# Patient Record
Sex: FEMALE | Race: WHITE | URBAN - METROPOLITAN AREA
[De-identification: names, ages, dates, MRNs, and addresses within clinical notes are randomized per-mention and may not be internally consistent; named-entity substitution may affect disease eponyms.]

---

## 2017-09-12 ENCOUNTER — HOSPITAL ENCOUNTER (OUTPATIENT)
Dept: PHYSICAL THERAPY | Age: 57
Discharge: HOME | End: 2017-09-30
Attending: FAMILY MEDICINE | Admitting: FAMILY MEDICINE

## 2017-09-12 ASSESSMENT — PAIN SCALES - GENERAL: PAINLEVEL_OUTOF10: 3

## 2017-09-12 ASSESSMENT — PAIN DESCRIPTION - ORIENTATION: ORIENTATION: RIGHT

## 2017-09-12 ASSESSMENT — PAIN DESCRIPTION - PAIN TYPE: TYPE: CHRONIC PAIN

## 2017-09-12 ASSESSMENT — PAIN DESCRIPTION - LOCATION: LOCATION: BACK

## 2017-09-12 ASSESSMENT — PAIN DESCRIPTION - DESCRIPTORS: DESCRIPTORS: SHARP;DULL

## 2017-09-12 ASSESSMENT — PAIN DESCRIPTION - PROGRESSION: CLINICAL_PROGRESSION: GRADUALLY WORSENING

## 2017-09-12 ASSESSMENT — PAIN DESCRIPTION - FREQUENCY: FREQUENCY: INTERMITTENT

## 2017-09-12 NOTE — FLOWSHEET NOTE
Outpatient Physical Therapy           [place]           [x] Phone: [phone]   Fax: [phone]  [place]           [] Phone: [phone]   Fax: [phone]    Physical Therapy Daily Treatment Note  Date:  2017    Patient Name:  Antoinette Stokes    :  1960  MRN: [id number]  Restrictions/Precautions:   Diagnosis:    Low back pain/ R shoulder pain   Date of Surgery:   Treatment Diagnosis: Treatment Diagnosis: M47.16, decreased strength, decreased core sterngth    Insurance/Certification information:  [address]  Referring Physician:   Dr. Amena Fitzpatrick  Next Doctor Visit:    Plan of care signed (Y/N):  sent  Visit# / total visits:   1/10  Pain level: 3/10 back    Goals:       Short term goals  Time Frame for Short term goals: defer to long term goals   Long term goals  Time Frame for Long term goals : 5 weeks: 10/18/17  Long term goal 1: Pt will be I with HEP   Long term goal 2: Pt will report performing full Toby activity without increase in pain in the shoulder. Long term goal 3: Pt will report no R sided back pain with equal alignment for at least 1 week. Long term goal 4: Pt will rdemonstrate full strength of the shoulder without pain. Long term goal 5: Pt will report sleeping without increse in pain in the back and shoulder. Subjective: Had previous therapy on the R shoulder and was doing fine but then started again in January/February without GOPI just started to hurt. She has tried to rest it and not work it and it gets better but mentally needs to work out to keep sanity. MRI with Dr. Lisa Rick: no injury to the RTC. X-ray : B OA of the shoulders . BACK: injury in the 20s  but the R sided back pain started a few years ago but has become more persist in the past 5 months. X-rays: lumbar spondylosis . no radicular pain. Tried therapy but helped very little    Any changes in Ambulatory Summary Sheet?     Objective:  See eval.       Exercises:  Exercise/Equipment 17 Date Date Date barriers to learning. Demonstrates no mental or cognitive disorder. Patient reports they learn best through demonstration.      Time In / Time Out:     1100/1215                  Timed Code/Total Treatment Minutes:  1 PTeval (), 2 TE (30')     Patients Report of Tolerance:    [] Patient limited by fatigue        [] Patient limited by pain   [] Patient limited by other medical complications   [x] Other: lisandro well     Prognosis:   [x] Good [] Fair  [] Poor    Plan:   [] Continue per plan of care [] Alter current plan (see comments)  [x] Plan of care initiated [] Hold pending MD visit [] Discharge    Plan for Next Session:    BACK: core stabilization exercises in standing (possible dead bug, hamstring stretching)  Shoulder: possible dry needling, RTC strengthening, scap strengthening with GOOD form      Next Progress Note due:      navarro 9/12/17      Electronically signed by:  Katherine Mota, PT 9/12/2017, 1:19 PM      9/12/2017 1:27 PM

## 2017-09-12 NOTE — PROGRESS NOTES
A)  [] CM  (80-99% Impairment, Dep.)   [] CN  (100% Impairment, Tot Dep.)  [] CH (0% Impaired, Indep.)  [] CI (1-19% Impaired, SBA-CGA)  [] CJ (20-39% Impaired, MIN A)  [] CK  (40-59% Impairment, Mod A)  [] CL  (60-79% Impairment, Max A)  [] CM  (80-99% Impairment, Dep.)   [] CN  (100% Impairment, Tot Dep.)          Electronically signed by:  Bibiana Cobian PT, 9/12/2017, 1:16 PM      9/12/2017 1:18 PM     If you have any questions or concerns, please don't hesitate to call.   Thank you for your referral.      Physician Signature:________________________________Date:_________ TIME: _____  By signing above, therapists plan is approved by physician
G-Codes  Functional Assessment Tool Used: DASH  Score: 27%  Functional Limitation: Changing and maintaining body position  Changing and Maintaining Body Position Current Status (): At least 20 percent but less than 40 percent impaired, limited or restricted  Changing and Maintaining Body Position Goal Status (): At least 1 percent but less than 20 percent impaired, limited or restricted                       Goals  Short term goals  Time Frame for Short term goals: defer to long term goals   Long term goals  Time Frame for Long term goals : 5 weeks: 10/18/17  Long term goal 1: Pt will be I with HEP   Long term goal 2: Pt will report performing full Toby activity without increase in pain in the shoulder. Long term goal 3: Pt will report no R sided back pain with equal alignment for at least 1 week. Long term goal 4: Pt will rdemonstrate full strength of the shoulder without pain. Long term goal 5: Pt will report sleeping without increse in pain in the back and shoulder.    Patient Goals   Patient goals : to have less pain with working out        Therapy Time   Individual Concurrent Group Co-treatment   Time In [address], [address] [facility]     9/12/2017 1:10 PM

## 2017-09-14 ENCOUNTER — HOSPITAL ENCOUNTER (OUTPATIENT)
Dept: PHYSICAL THERAPY | Age: 57
Discharge: HOME | End: 2017-09-14
Admitting: FAMILY MEDICINE

## 2017-09-21 ENCOUNTER — HOSPITAL ENCOUNTER (OUTPATIENT)
Dept: PHYSICAL THERAPY | Age: 57
Discharge: HOME | End: 2017-09-21
Admitting: FAMILY MEDICINE

## 2017-09-26 ENCOUNTER — HOSPITAL ENCOUNTER (OUTPATIENT)
Dept: PHYSICAL THERAPY | Age: 57
Discharge: HOME | End: 2017-09-26
Admitting: FAMILY MEDICINE

## 2017-09-28 ENCOUNTER — HOSPITAL ENCOUNTER (OUTPATIENT)
Dept: PHYSICAL THERAPY | Age: 57
Discharge: HOME | End: 2017-09-28
Admitting: FAMILY MEDICINE

## 2017-10-01 ENCOUNTER — HOSPITAL ENCOUNTER (OUTPATIENT)
Dept: OTHER | Age: 57
Discharge: HOME | End: 2017-10-31
Attending: FAMILY MEDICINE | Admitting: FAMILY MEDICINE

## 2017-10-03 ENCOUNTER — HOSPITAL ENCOUNTER (OUTPATIENT)
Dept: PHYSICAL THERAPY | Age: 57
Discharge: HOME | End: 2017-10-03
Admitting: FAMILY MEDICINE

## 2017-10-03 NOTE — FLOWSHEET NOTE
well. Pt rated pain 3/10 after treatment. Time In / Time Out:   1033/1128    Timed Code/Total Treatment Minutes: 45'/55': 3 TE (39') 10' CP     Patients Report of Tolerance:    [] Patient limited by fatigue        [] Patient limited by pain   [] Patient limited by other medical complications   [x] Other: lisandro well     Prognosis:   [x] Good [] Fair  [] Poor    Plan:   [] Continue per plan of care [] Alter current plan (see comments)  [x] Plan of care initiated [] Hold pending MD visit [] Discharge    Plan for Next Session:    BACK: core stabilization exercises in standing (possible dead bug, hamstring stretching)  Shoulder: possible dry needling, RTC strengthening, scap strengthening with GOOD form  Primary PT leaving facility on October 6th. Transfer POC to Denisse Baig PT.        Next Progress Note due:      navarro 9/12/17, PN next visit       Electronically signed by:  Zuly Doran, PT 10/3/2017, 8:48 AM      10/3/2017 11:29 AM

## 2017-10-05 ENCOUNTER — HOSPITAL ENCOUNTER (OUTPATIENT)
Dept: PHYSICAL THERAPY | Age: 57
Discharge: HOME | End: 2017-10-05
Admitting: FAMILY MEDICINE

## 2017-10-05 NOTE — PROGRESS NOTES
()   [] Goal ()   [] DC ()  [] Self-Care     [] Current ()   [] Goal ()   [] DC ()  [] Other PT/OT primary DX     [] Current ()   [] Goal ()   [] DC ()    SEVERITY  CURRENT  GOAL  DISCHARGE   [] CH (0% Impaired, Indep.)  [x] CI (1-19% Impaired, SBA-CGA)  [] CJ (20-39% Impaired, MIN A)  [] CK  (40-59% Impairment, Mod A)  [] CL  (60-79% Impairment, Max A)  [] CM  (80-99% Impairment, Dep.)   [] CN  (100% Impairment, Tot Dep.) [x] CH (0% Impaired, Indep.)  [] CI (1-19% Impaired, SBA-CGA)  [] CJ (20-39% Impaired, MIN A)  [] CK  (40-59% Impairment, Mod A)  [] CL  (60-79% Impairment, Max A)  [] CM  (80-99% Impairment, Dep.)   [] CN  (100% Impairment, Tot Dep.)  [] CH (0% Impaired, Indep.)  [] CI (1-19% Impaired, SBA-CGA)  [] CJ (20-39% Impaired, MIN A)  [] CK  (40-59% Impairment, Mod A)  [] CL  (60-79% Impairment, Max A)  [] CM  (80-99% Impairment, Dep.)   [] CN  (100% Impairment, Tot Dep.)          Frequency/Duration:  # Days per week: [] 1 day # Weeks: [] 1 week [] 4 weeks [] 8 weeks     [x] 2 days? [] 2 weeks [x] 5 weeks [] 10 weeks     [] 3 days   [] 3 weeks [] 6 weeks [] 12 weeks       Rehab Potential: [] Excellent [x] Good [] Fair  [] Poor         Patient Status: [x] Continue per initial plan of Care     [] Patient now discharged     [x] Additional visits requested, Please re-certify for additional visits:      Requested frequency/duration:  2/week for 4/weeks    If we are requesting more visits, we fully anticipate the patient's condition is expected to improve within the treatment timeframe we are requesting. Electronically signed by:  Trevor Johnson PT, 10/5/2017, 2:54 PM    10/5/2017 2:55 PM     If you have any questions or concerns, please don't hesitate to call.   Thank you for your referral.    Physician Signature:______________________ Date:______ Time: ________  By signing above, therapists plan is approved by physician

## 2017-10-05 NOTE — FLOWSHEET NOTE
Outpatient Physical Therapy           [place]           [x] Phone: [phone]   Fax: [phone]  [place]           [] Phone: [phone]   Fax: [phone]    Physical Therapy Daily Treatment Note  Date:  10/5/2017    Patient Name:  Nubia Xiao    :  1960  MRN: [id number]  Restrictions/Precautions:   Diagnosis:    Low back pain/ R shoulder pain   Date of Surgery:   Treatment Diagnosis: Treatment Diagnosis: M47.16, decreased strength, decreased core sterngth    Insurance/Certification information:  [address]  Referring Physician:   Dr. Blaine Tyler  Next Doctor Visit:    Plan of care signed (Y/N):  sent  Visit# / total visits:   8/10  Pain level: 10 back, R shoulder 2/10   Goals:       Short term goals  Time Frame for Short term goals: defer to long term goals   Long term goals  Time Frame for Long term goals : 5 weeks: 10/18/17  Long term goal 1: Pt will be I with HEP. MET   Long term goal 2: Pt will report performing full Toby activity without increase in pain in the shoulder. Progressing   Long term goal 3: Pt will report no R sided back pain with equal alignment for at least 1 week. Progressing   Long term goal 4: Pt will rdemonstrate full strength of the shoulder without pain. Partially MET   Long term goal 5: Pt will report sleeping without increse in pain in the back and shoulder. Progressing         Subjective: Pt reports that she feels good today. After last session her pain was quite a bit high though. Had to take aleve. Still not doing some of the arm moves in [address] because that will increase shoulder pain. Hip flexors are getting better with Toby if she stretches before. Hasn't done yoga or weights at all. Will try some of the weight machines in the next week. SI joint pain does still come and go and sometimes is okay but other times can flare up. Was sleeping okay but after last session was really bad/ Last session sleeping was worst  That it was before therapy.  Other than Re-education    [] Cold/hotpack [] Iontophoresis   [x] Instruction in HEP      [] Vasopneumatic     [x] Manual Therapy               [] Aquatic Therapy     Manual Treatments:       Modalities:  CP to the R shoulder and CP to the SI region in sacrum x10'    Communication with other providers:  CErt. Faxed 9/12/17    Education provided to patient/caregiver:  Core strengthening, posture    Adverse reactions to treatment:  none    Equipment provided:  ZONIA LEIGH    Assessment: Pt has made progressing with pain levels but still has inconsistent pain and will be high after therapy but returns to normal the day after therapy. Pelvis and sacrum have remained level over multiple weeks and patient does very well with core engagement and TrA with all activities. HIp extension and back extensor strength is very impaired. Shoulder trigger points improved with dry needling but then increased in new spot but patient hesitant to try needling again because of the temporary increase in pain that the needling caused. Pt continues to need cues to keep scapular retracted and to maintain good scapular stability with exercises and UT frequently over compensates with elevation motion. This has made difficulty for patient to return to Toby and yoga. Pt to continue to benefit from skilled PT services to increase scapular stability and strength to return to PLOF without increase in shoulder pain.      Time In / Time Out:  1347/1450    Timed Code/Total Treatment Minutes: 53'/63': 4 TE (48') 10' CP     Patients Report of Tolerance:    [] Patient limited by fatigue        [] Patient limited by pain   [] Patient limited by other medical complications   [x] Other: lisandro well     Prognosis:   [x] Good [] Fair  [] Poor    Plan:   [] Continue per plan of care [] Alter current plan (see comments)  [x] Plan of care initiated [] Hold pending MD visit [] Discharge    Plan for Next Session:    BACK: core stabilization exercises in standing (possible dead bug,

## 2017-10-10 ENCOUNTER — HOSPITAL ENCOUNTER (OUTPATIENT)
Dept: PHYSICAL THERAPY | Age: 57
Discharge: HOME | End: 2017-10-10
Admitting: FAMILY MEDICINE

## 2017-10-10 NOTE — FLOWSHEET NOTE
Outpatient Physical Therapy           [place]           [x] Phone: [phone]   Fax: [phone]  [place]           [] Phone: [phone]   Fax: [phone]    Physical Therapy Daily Treatment Note  Date:  10/10/2017    Patient Name:  Alexx Sanchez    :  1960  MRN: [id number]  Restrictions/Precautions:   Diagnosis:    Low back pain/ R shoulder pain   Date of Surgery:   Treatment Diagnosis: Treatment Diagnosis: M47.16, decreased strength, decreased core sterngth    Insurance/Certification information:  [facility]  Referring Physician:   Dr. Kale Abraham  Next Doctor Visit:    Plan of care signed (Y/N):  Sent - refaxed 10/10  Visit# / total visits:   9/10  Pain level: 4-5/10 R shoulder, 0-2/10 back pain      Goals:       Short term goals  Time Frame for Short term goals: defer to long term goals   Long term goals  Time Frame for Long term goals : 5 weeks: 10/18/17  Long term goal 1: Pt will be I with HEP. MET   Long term goal 2: Pt will report performing full Toby activity without increase in pain in the shoulder. Progressing   Long term goal 3: Pt will report no R sided back pain with equal alignment for at least 1 week. Progressing   Long term goal 4: Pt will rdemonstrate full strength of the shoulder without pain. Partially MET   Long term goal 5: Pt will report sleeping without increse in pain in the back and shoulder. Progressing         Subjective: Patient states that since last session her pain went up to an 8/10 in the R shoulder and was like that for a couple of days. Pain is down now but still about a 4-6/10, had to take an aleve. Any changes in Ambulatory Summary Sheet? No     Objective:  Cueing to increase glut firing with supine walk outs.       Exercises:  Exercise/Equipment 10/3/17 10/5/17 10/10/17         Elliptical    5'  5' ramp 5 Ramp 5 5'   Hip flexor stretch X30\" ea LE x1' ea LE 1' ea LE    Hamstring stretch  X30\" ea LE  x1' ea  1' ea         BACK       Prone donkey kicks on ball  x15 ea LE x15 ea LE  15* ea LE    Prone SB with alt UE/LE kicks  x15 ea  x15 ea  15* ea   SB walk out (supine)  x15 x15  15*    Prone ball walk outs with SA  x15 x15 -   Prone on ball back ext to neutral  x10 x10  10*   Seated ball with TrA and opp pulls (flex/ext )  GTB x15 ea  GTB x15 ea   GTB 15* ea   TRX rows 2x10  2x10 --   BOSU plank with arms straight  4x15\"  --   IR/ER BTB x15 ea dir  BTB x15 ea dir  BTB 15* ea dir    Prone   Ext  HA  Y   x20  2x10  x10    1# x20  2x10  x10   20*  20*  10*   Diagonals  BTB x15 ea  BTB x15 ea   BTB 15* ea   Ball on wall  x10 ea dir  x10 ea dir   10* ea   BOSU rocks    x20 ea dir  --             Other Therapeutic Activities/Education:      Home Exercise Program:  Continue current. Modality/intervention used:    [x] Therapeutic Exercise  [] Modalities:  [] Therapeutic Activity     [] Ultrasound  [] Elec  Stim  [] Gait Training      [] Cervical Traction [] Lumbar Traction  [] Neuromuscular Re-education    [] Cold/hotpack [] Iontophoresis   [x] Instruction in HEP      [] Vasopneumatic     [x] Manual Therapy               [] Aquatic Therapy     Manual Treatments:       Modalities:  CP to the R shoulder and CP to the SI region in sacrum x10'    Communication with other providers:  CErt. Faxed 9/12/17    Education provided to patient/caregiver:  Core strengthening, posture    Adverse reactions to treatment:  none    Equipment provided:  ZONIA LEIGH    Assessment: Patient was more inflamed in the shoulder this date so held on several of those exercises and focused more on the core. Patient weak in isolated glut activity and core stability. Will benefit from attempted progression of core stability in varying positions. 2-3/10 R shoulder, 1-2/10 SI joint pain.     Time In / Time Out:  1117/1215    Timed Code/Total Treatment Minutes: 48'/58' 1 NR 15' 2 TE 38'    Patients Report of Tolerance:    [] Patient limited by fatigue        [] Patient limited by pain   [] Patient limited by other medical complications   [x] Other: lisandro well     Prognosis:   [x] Good [] Fair  [] Poor    Plan:   [] Continue per plan of care [] Alter current plan (see comments)  [x] Plan of care initiated [] Hold pending MD visit [] Discharge    Plan for Next Session:    BACK: core stabilization exercises in standing (possible dead bug, hamstring stretching)  Shoulder: possible dry needling, RTC strengthening, scap strengthening with GOOD form. Add tall kneeling core activities next session. Primary PT leaving facility on October 6th. Transfer POC to Larisa Crowder PT.        Next Progress Note due:      eval 9/12/17, PN 10/5/17      Electronically signed by:  Leobardo Urena PTA      10/10/2017 8:14 AM

## 2017-10-13 ENCOUNTER — HOSPITAL ENCOUNTER (OUTPATIENT)
Dept: PHYSICAL THERAPY | Age: 57
Discharge: HOME | End: 2017-10-13
Admitting: FAMILY MEDICINE

## 2017-10-13 NOTE — FLOWSHEET NOTE
Outpatient Physical Therapy           [place]           [x] Phone: [phone]   Fax: [phone]  [place]           [] Phone: [phone]   Fax: [phone]    Physical Therapy Daily Treatment Note  Date:  10/13/2017    Patient Name:  Polo Temple    :  1960  MRN: [id number]  Restrictions/Precautions:   Diagnosis:    Low back pain/ R shoulder pain   Date of Surgery:   Treatment Diagnosis: Treatment Diagnosis: M47.16, decreased strength, decreased core sterngth    Insurance/Certification information:  [facility]- [facility] [address]  Referring Physician:   Dr. Kristy Amin  Next Doctor Visit:    Plan of care signed (Y/N):  Sent - refaxed 10/10  Visit# / total visits:   10/10  Pain level: 2-3/10 R shoulder, 0-2/10 back pain      Goals:       Short term goals  Time Frame for Short term goals: defer to long term goals   Long term goals  Time Frame for Long term goals : 5 weeks: 10/18/17  Long term goal 1: Pt will be I with HEP. MET   Long term goal 2: Pt will report performing full Toby activity without increase in pain in the shoulder. Progressing   Long term goal 3: Pt will report no R sided back pain with equal alignment for at least 1 week. Progressing   Long term goal 4: Pt will rdemonstrate full strength of the shoulder without pain. Partially MET   Long term goal 5: Pt will report sleeping without increse in pain in the back and shoulder. Progressing         Subjective: Patient states that the shoulders were flared again after last session, not sure why since     Any changes in Ambulatory Summary Sheet? No     Objective: Better stability with alt UE/LE on ball today. Limited with range on reverse chops due to shoulder pain.       Exercises:  Exercise/Equipment 10/3/17 10/5/17 10/10/17           Elliptical    5'  5' ramp 5 Ramp 5 5' Elliptical 5'   Hip flexor stretch X30\" ea LE x1' ea LE 1' ea LE  1' ea LE    Hamstring stretch  X30\" ea LE  x1' ea  1' ea 1' ea           BACK        Prone donkey kicks on ball  x15 ea

## 2017-10-26 ENCOUNTER — HOSPITAL ENCOUNTER (OUTPATIENT)
Dept: PHYSICAL THERAPY | Age: 57
Discharge: HOME | End: 2017-10-26
Admitting: FAMILY MEDICINE

## 2017-10-26 NOTE — FLOWSHEET NOTE
Outpatient Physical Therapy           [place]           [x] Phone: [phone]   Fax: [phone]  [place]           [] Phone: [phone]   Fax: [phone]    Physical Therapy Daily Treatment Note  Date:  10/26/2017    Patient Name:  Reggie Parra    :  1960  MRN: [id number]  Restrictions/Precautions:   Diagnosis:    Low back pain/ R shoulder pain   Date of Surgery:   Treatment Diagnosis: Treatment Diagnosis: M47.16, decreased strength, decreased core sterngth    Insurance/Certification information:  [address]  Referring Physician:   Dr. Sumaya Maria  Next Doctor Visit:  Nothing planned currently   Plan of care signed (Y/N):  Sent - refaxed 10/10  Visit# / total visits:   10/10; ? Pain level: 3/10 shoulders,  3/10 back       Goals:       Short term goals  Time Frame for Short term goals: defer to long term goals   Long term goals  Time Frame for Long term goals : 5 weeks: 10/18/17  Long term goal 1: Pt will be I with HEP. MET   Long term goal 2: Pt will report performing full Toby activity without increase in pain in the shoulder. Progressing   Long term goal 3: Pt will report no R sided back pain with equal alignment for at least 1 week. Progressing   Long term goal 4: Pt will rdemonstrate full strength of the shoulder without pain. Partially MET   Long term goal 5: Pt will report sleeping without increse in pain in the back and shoulder. Progressing         Subjective: Has had some increased pain recently, mowed lawn and did [place], both back and shoulder worse and L shoulder too. Having some N/T in UE too. Took Aleve and helping some, but only takes this if really has to, like 4x/year. Hasn't had much of a chance to do exercises this week either. Any changes in Ambulatory Summary Sheet? No     Objective: I  Pt had pain again tonight w/ several of her shoulder exercises, especially OH work, but even ext on the ball at end of 10 reps.   Alignment of pelvis and sacrum was WNL tonight w/o corrections needed. Exercises:  Exercises 10/19/17 10/26/17            Cardio/Endurance       Elliptical 5'  5'            Stretching       Hip flexors 1' ea 1' ea supine today     HS 1' ea 1' ea            Strengthening/ROM       Dead lift w/row  10# 10* ea --     IR/ER BTB - -- At 39? Planks FWD - --     Bridges on ball  - --     SL bridges  10* ea 5x2ea     Fire hydrants  15* ea 5x2ea     Ball or wall or other RS                     Prone on SB -        Donkey kicks 15* ea 15xea     Shoulder ext 15* 10x     Shoulder HA  12* --     Shoulder Y 5* --     Shoulder W scap squeeze 10* pain     Walk outs  10* 10x            Neuro Re-ed       BOSU rocks       Tall kneeling chops  BTB 10* ea BTB 10x ea     Tall kneeling reverse chops  BTB 10* ea BTB 10x ea     D2 flexion BTB 15* BTB 10x ea     D1 extension  BTB 15* BTB 10x ea     Wall saw -- 10x       Other Therapeutic Activities/Education:     Home Exercise Program:  Continue current. Modality/intervention used:    [x] Therapeutic Exercise  [] Modalities:  [] Therapeutic Activity     [] Ultrasound  [] Elec  Stim  [] Gait Training      [] Cervical Traction [] Lumbar Traction  [] Neuromuscular Re-education    [] Cold/hotpack [] Iontophoresis   [x] Instruction in HEP      [] Vasopneumatic     [x] Manual Therapy               [] Aquatic Therapy     Manual Treatments:       Modalities:  CP to the R & L shoulder and CP to the SI region in sacrum x10'  (Consider Estim for pain control prn)    Communication with other providers:  CErt. Faxed 9/12/17    Education provided to patient/caregiver: Adverse reactions to treatment:  none    Equipment provided:      Assessment:  Patient having flare up of back and shoulders, L some too, this date, though better currently w/ meds. Pt has poor scapular control and tolerance to exercises for this, but core seems to do ok.   She also demonstrates some impingement type symptoms w/ increased crepitus w/

## 2017-11-01 ENCOUNTER — HOSPITAL ENCOUNTER (OUTPATIENT)
Dept: OTHER | Age: 57
Discharge: HOME | End: 2017-11-30
Attending: FAMILY MEDICINE | Admitting: FAMILY MEDICINE

## 2017-11-02 ENCOUNTER — HOSPITAL ENCOUNTER (OUTPATIENT)
Dept: PHYSICAL THERAPY | Age: 57
Discharge: HOME | End: 2017-11-02
Admitting: FAMILY MEDICINE

## 2017-11-02 NOTE — PROGRESS NOTES
Outpatient Physical Therapy           [place]           [x] Phone: [phone]   Fax: [phone]  [place]           [] Phone: [phone]   Fax: [phone]      To:  Dr. Yanelis Combs    From: Nikki Marte, PT     Patient: Fernanda Lincoln                  : 1960  Diagnosis: Low back pain/ R shoulder pain   Date: 2017  Treatment Diagnosis:   M47.16, decreased strength, decreased core sterngth      [x]  Progress Note                []  Discharge Note    Evaluation Date:   17 Total Visits to date: 15 Cancels/No-shows to date:  0    Subjective: Both neck and back were bad early this week, no known reason and pt really frustrated. Left message for PCP but not call back yet. Pt had to take more Flexeril and she has been very frustrated, wondering about trying Dry Needling again? ?  She is not sure what to do. Historically rest has helped the most, but doesn't really want to do this, needs the release d/t stress.       Plan of Care/Treatment to date:  [x] Therapeutic Exercise    [x] Modalities:  [] Therapeutic Activity     [] Ultrasound  [x] Electrical Stimulation  [] Gait Training      [] Cervical Traction   [] Lumbar Traction  [] Neuromuscular Re-education  [x] Cold/hotpack [] Iontophoresis  [x] Instruction in HEP      Other:  [x] Manual Therapy       []  Vasopneumatic  [] Aquatic Therapy       [x]     Dry needling-1x                ? Objective/Significant Findings At Last Visit/Comments: We spent most of our session today talking about what is working and what isn't and options for her at this point. She is not responding as we had hoped to PT at this time. She does have weaknness in scapular region and also core/SI region, but exercise not addressing it fully at this time. Pt very tense B UT and does not tolerate trigger point work well, but lighter STM ok. We did review and update HEP today as well and discuss our plan. Issued pt list of names for massage therapy as well. Assessment:   Patient having flare up of back and shoulders again. Pt has poor scapular control and tolerance to exercises for this, even core has not been going as well lately. She also demonstrates some impingement type symptoms w/ increased crepitus w/ anything above 90 especially but even some scap work too. She also demonstrates some general soft tissue limitations and may benefit from further evaluation of this as well. 2/10 shoulders back 2/10 after Rx. Pt will benefit from additional medical evaluation at this point. We are recommending PM&R or DO w/ musculoskeletal focus. We feel she could benefit from PT to address her weakness and poor tolerance to activity but may need additional medical intervention first.      Goal Status:  [] Achieved [x] Partially Achieved  [] Not Achieved   Short term goals  Time Frame for Short term goals: defer to long term goals   Long term goals  Time Frame for Long term goals : 5 weeks: 10/18/17  Long term goal 1: Pt will be I with HEP. MET   Long term goal 2: Pt will report performing full Toby activity without increase in pain in the shoulder. Progressing   Long term goal 3: Pt will report no R sided back pain with equal alignment for at least 1 week. Progressing   Long term goal 4: Pt will rdemonstrate full strength of the shoulder without pain. Partially MET   Long term goal 5: Pt will report sleeping without increse in pain in the back and shoulder.  Progressing     G-Code Selection: (On Eval and every 10th visit or Discharge)  MEASURE  [] Mobility: Walking and Moving Around     [] Current ()   [] Goal ()   [] DC ()  [x] Changing/Maintaining Body Position     [x] Current (2336)      [x] Goal ()   [] DC ()  [] Carrying / Moving / Handling Objects     [] Current ()   [] Goal ()   [] DC ()  [] Self-Care     [] Current ()   [] Goal ()   [] DC ()  [] Other PT/OT primary DX     [] Current ()   [] Goal ()

## 2017-11-02 NOTE — FLOWSHEET NOTE
Outpatient Physical Therapy           [place]           [x] Phone: [phone]   Fax: [phone]  [facility]           [] Phone: [phone]   Fax: [phone]    Physical Therapy Daily Treatment Note  Date:  2017    Patient Name:  Leyda Palomares    :  1960  MRN: [id number]  Restrictions/Precautions:   Diagnosis:    Low back pain/ R shoulder pain   Date of Surgery:   Treatment Diagnosis: Treatment Diagnosis: M47.16, decreased strength, decreased core sterngth    Insurance/Certification information:  [address]  Referring Physician:   Dr. Marika Hanks (PCP)  Next Doctor Visit:  Nothing planned currently   Plan of care signed (Y/N):  Sent - refaxed 10/10  Visit# / total visits:   10/10; 3/8? Pain level: 4/10 shoulders,  2/10 back       Goals:       Short term goals  Time Frame for Short term goals: defer to long term goals   Long term goals  Time Frame for Long term goals : 5 weeks: 10/18/17  Long term goal 1: Pt will be I with HEP. MET   Long term goal 2: Pt will report performing full Toby activity without increase in pain in the shoulder. Progressing   Long term goal 3: Pt will report no R sided back pain with equal alignment for at least 1 week. Progressing   Long term goal 4: Pt will rdemonstrate full strength of the shoulder without pain. Partially MET   Long term goal 5: Pt will report sleeping without increse in pain in the back and shoulder. Progressing         Subjective: Both neck and back were bad early this week, no known reason and pt really frustrated. Had to take more Flexeril and she has been very frustrated, wondering about trying Dry Needling again? ?  She is not sure what to do. Historically rest has helped the most, but doesn't really want to do this, needs the release d/t stress. Any changes in Ambulatory Summary Sheet? No     Objective: We spent most of our session today talking about what is working and what isn't and options for her at this point.   She is not responding as we had hoped to PT at this time. She does have weaknness in scapular region and also core/SI region, but exercise not addressing it fully at this time. Pt very tense B UT and does not tolerate trigger point work well, but lighter STM ok. We did review and update HEP today as well and discuss our plan. Exercises:  Exercises 10/19/17 10/26/17 11/2/17           Cardio/Endurance       Elliptical 5'  5' --           Stretching       Hip flexors 1' ea 1' ea supine today --    HS 1' ea 1' ea --           Strengthening/ROM       Dead lift w/row  10# 10* ea -- --    IR/ER BTB - -- --    Planks FWD - -- --    Bridges on ball  - -- --    SL bridges  10* ea 5x2ea --    Fire hydrants  15* ea 5x2ea --    Ball or wall or other RS                     Prone on SB -        Donkey kicks 15* ea 15xea ----    Shoulder ext 15* 10x ---    Shoulder HA  12* -- --    Shoulder Y 5* -- --    Shoulder W scap squeeze 10* pain --    Walk outs  10* 10x --           Neuro Re-ed       BOSU rocks       Tall kneeling chops  BTB 10* ea BTB 10x ea --    Tall kneeling reverse chops  BTB 10* ea BTB 10x ea --    D2 flexion BTB 15* BTB 10x ea --    D1 extension  BTB 15* BTB 10x ea --    Wall saw -- 10x --      Other Therapeutic Activities/Education: as above    Home Exercise Program:  Updated    Modality/intervention used:    [x] Therapeutic Exercise  [x] Modalities:  [] Therapeutic Activity     [] Ultrasound  [x] Elec  Stim  [] Gait Training      [] Cervical Traction [] Lumbar Traction  [] Neuromuscular Re-education    [x] Cold/hotpack [] Iontophoresis   [x] Instruction in HEP      [] Vasopneumatic     [x] Manual Therapy               [] Aquatic Therapy     Manual Treatments: manual work to B UT for decreased tension and pain 10'      Modalities:  CP to the cervical w/ IFC C/T,  and CP to the SI region x15'      Communication with other providers:  CErt.  Faxed 9/12/17, see note 10/5/17, 11/2/17    Education provided to patient/caregiver: Adverse reactions to treatment:  none    Equipment provided:      Assessment:  Patient having flare up of back and shoulders again. Pt has poor scapular control and tolerance to exercises for this, even core has not been going as well lately. She also demonstrates some impingement type symptoms w/ increased crepitus w/ anything above 90 especially but even some scap work too. She also demonstrates some general soft tissue limitations and may benefit from further evaluation of this as well. 2/10 shoulders back 2/10 after Rx. Pt will benefit from additional medical evaluation at this point. We are recommending PM&R or DO w/ musculoskeletal focus.   We feel she could benefit from PT to address her weakness and poor tolerance to activity but may need additional medical intervention first.      Time In / Time Out:  1115/1215      Timed Code/Total Treatment Minutes:   45/45 (some time not billable)      Patients Report of Tolerance:    [] Patient limited by fatigue        [] Patient limited by pain   [] Patient limited by other medical complications   [x] Other: lisandro well     Prognosis:   [x] Good [] Fair  [] Poor    Plan:   [] Continue per plan of care [] Alter current plan (see comments)  [] Plan of care initiated [x] Hold pending MD visit [] Discharge    Plan for Next Session:   Re-assess     Next Progress Note due:      eval 9/12/17, PN 10/5/17, 11/2/17      Electronically signed by:  Maylin Allison PT, MPT, ATC      11/2/2017, 12:33 PM

## 2017-12-01 ENCOUNTER — HOSPITAL ENCOUNTER (OUTPATIENT)
Dept: OTHER | Age: 57
Discharge: HOME | End: 2017-12-05
Attending: FAMILY MEDICINE | Admitting: FAMILY MEDICINE

## 2022-08-01 ENCOUNTER — OFFICE (OUTPATIENT)
Dept: URBAN - METROPOLITAN AREA CLINIC 16 | Facility: CLINIC | Age: 62
End: 2022-08-01

## 2022-08-01 VITALS
SYSTOLIC BLOOD PRESSURE: 121 MMHG | HEART RATE: 85 BPM | DIASTOLIC BLOOD PRESSURE: 70 MMHG | HEIGHT: 67 IN | WEIGHT: 159 LBS

## 2022-08-01 DIAGNOSIS — R07.89 OTHER CHEST PAIN: ICD-10-CM

## 2022-08-01 DIAGNOSIS — K21.9 GASTRO-ESOPHAGEAL REFLUX DISEASE WITHOUT ESOPHAGITIS: ICD-10-CM

## 2022-08-01 DIAGNOSIS — I25.10 ATHEROSCLEROTIC HEART DISEASE OF NATIVE CORONARY ARTERY WITH: ICD-10-CM

## 2022-08-01 PROCEDURE — 99213 OFFICE O/P EST LOW 20 MIN: CPT | Performed by: INTERNAL MEDICINE

## 2025-05-19 ENCOUNTER — APPOINTMENT (OUTPATIENT)
Dept: GENERAL RADIOLOGY | Age: 65
End: 2025-05-19
Attending: EMERGENCY MEDICINE
Payer: COMMERCIAL

## 2025-05-19 ENCOUNTER — HOSPITAL ENCOUNTER (EMERGENCY)
Age: 65
Discharge: HOME | End: 2025-05-19
Attending: EMERGENCY MEDICINE
Payer: COMMERCIAL

## 2025-05-19 VITALS
SYSTOLIC BLOOD PRESSURE: 145 MMHG | RESPIRATION RATE: 14 BRPM | TEMPERATURE: 97.9 F | HEART RATE: 77 BPM | DIASTOLIC BLOOD PRESSURE: 76 MMHG | HEIGHT: 67 IN | WEIGHT: 135 LBS | BODY MASS INDEX: 21.19 KG/M2 | OXYGEN SATURATION: 96 %

## 2025-05-19 DIAGNOSIS — R00.2 PALPITATIONS: ICD-10-CM

## 2025-05-19 DIAGNOSIS — R07.9 CHEST PAIN, UNSPECIFIED TYPE: Primary | ICD-10-CM

## 2025-05-19 LAB
ALBUMIN SERPL-MCNC: 4.5 G/DL (ref 3.4–5)
ALBUMIN/GLOB SERPL: 1.8 {RATIO} (ref 1.1–2.2)
ALP SERPL-CCNC: 139 U/L (ref 40–129)
ALT SERPL-CCNC: 47 U/L (ref 10–40)
ANION GAP SERPL CALCULATED.3IONS-SCNC: 15 MMOL/L (ref 9–17)
AST SERPL-CCNC: 31 U/L (ref 15–37)
BASOPHILS # BLD: 0.06 K/UL
BASOPHILS NFR BLD: 1 % (ref 0–1)
BILIRUB SERPL-MCNC: 0.4 MG/DL (ref 0–1)
BUN SERPL-MCNC: 19 MG/DL (ref 7–20)
CALCIUM SERPL-MCNC: 9.3 MG/DL (ref 8.3–10.6)
CHLORIDE SERPL-SCNC: 102 MMOL/L (ref 99–110)
CO2 SERPL-SCNC: 23 MMOL/L (ref 21–32)
CREAT SERPL-MCNC: 0.9 MG/DL (ref 0.6–1.2)
EKG ATRIAL RATE: 105 BPM
EKG DIAGNOSIS: NORMAL
EKG P AXIS: 65 DEGREES
EKG P-R INTERVAL: 180 MS
EKG Q-T INTERVAL: 342 MS
EKG QRS DURATION: 76 MS
EKG QTC CALCULATION (BAZETT): 452 MS
EKG R AXIS: 68 DEGREES
EKG T AXIS: 61 DEGREES
EKG VENTRICULAR RATE: 105 BPM
EOSINOPHIL # BLD: 0.26 K/UL
EOSINOPHILS RELATIVE PERCENT: 3 % (ref 0–3)
ERYTHROCYTE [DISTWIDTH] IN BLOOD BY AUTOMATED COUNT: 12.8 % (ref 11.7–14.9)
GFR, ESTIMATED: 63 ML/MIN/1.73M2
GLUCOSE SERPL-MCNC: 122 MG/DL (ref 74–99)
HCT VFR BLD AUTO: 42.8 % (ref 37–47)
HGB BLD-MCNC: 13.9 G/DL (ref 12.5–16)
IMM GRANULOCYTES # BLD AUTO: 0.02 K/UL
IMM GRANULOCYTES NFR BLD: 0 %
LYMPHOCYTES NFR BLD: 3.02 K/UL
LYMPHOCYTES RELATIVE PERCENT: 38 % (ref 24–44)
MAGNESIUM SERPL-MCNC: 2 MG/DL (ref 1.8–2.4)
MCH RBC QN AUTO: 28.1 PG (ref 27–31)
MCHC RBC AUTO-ENTMCNC: 32.5 G/DL (ref 32–36)
MCV RBC AUTO: 86.6 FL (ref 78–100)
MONOCYTES NFR BLD: 0.67 K/UL
MONOCYTES NFR BLD: 8 % (ref 0–5)
NEUTROPHILS NFR BLD: 49 % (ref 36–66)
NEUTS SEG NFR BLD: 3.97 K/UL
PLATELET # BLD AUTO: 311 K/UL (ref 140–440)
PMV BLD AUTO: 9.2 FL (ref 7.5–11.1)
POTASSIUM SERPL-SCNC: 3.5 MMOL/L (ref 3.5–5.1)
PROT SERPL-MCNC: 7 G/DL (ref 6.4–8.2)
RBC # BLD AUTO: 4.94 M/UL (ref 4.2–5.4)
SODIUM SERPL-SCNC: 139 MMOL/L (ref 136–145)
T4 FREE SERPL-MCNC: 1.1 NG/DL (ref 0.9–1.8)
TROPONIN I SERPL HS-MCNC: 7 NG/L (ref 0–14)
TROPONIN I SERPL HS-MCNC: <6 NG/L (ref 0–14)
TSH SERPL DL<=0.05 MIU/L-ACNC: 7.88 UIU/ML (ref 0.27–4.2)
WBC OTHER # BLD: 8 K/UL (ref 4–10.5)

## 2025-05-19 PROCEDURE — 84484 ASSAY OF TROPONIN QUANT: CPT

## 2025-05-19 PROCEDURE — 83735 ASSAY OF MAGNESIUM: CPT

## 2025-05-19 PROCEDURE — 96374 THER/PROPH/DIAG INJ IV PUSH: CPT

## 2025-05-19 PROCEDURE — 85025 COMPLETE CBC W/AUTO DIFF WBC: CPT

## 2025-05-19 PROCEDURE — 84439 ASSAY OF FREE THYROXINE: CPT

## 2025-05-19 PROCEDURE — 84443 ASSAY THYROID STIM HORMONE: CPT

## 2025-05-19 PROCEDURE — 93010 ELECTROCARDIOGRAM REPORT: CPT | Performed by: INTERNAL MEDICINE

## 2025-05-19 PROCEDURE — 6360000002 HC RX W HCPCS: Performed by: EMERGENCY MEDICINE

## 2025-05-19 PROCEDURE — 99285 EMERGENCY DEPT VISIT HI MDM: CPT

## 2025-05-19 PROCEDURE — 71045 X-RAY EXAM CHEST 1 VIEW: CPT

## 2025-05-19 PROCEDURE — 80053 COMPREHEN METABOLIC PANEL: CPT

## 2025-05-19 PROCEDURE — 93005 ELECTROCARDIOGRAM TRACING: CPT | Performed by: EMERGENCY MEDICINE

## 2025-05-19 RX ORDER — ONDANSETRON 2 MG/ML
4 INJECTION INTRAMUSCULAR; INTRAVENOUS ONCE
Status: COMPLETED | OUTPATIENT
Start: 2025-05-19 | End: 2025-05-19

## 2025-05-19 RX ADMIN — ONDANSETRON 4 MG: 2 INJECTION, SOLUTION INTRAMUSCULAR; INTRAVENOUS at 07:24

## 2025-05-19 ASSESSMENT — PAIN DESCRIPTION - LOCATION: LOCATION: CHEST

## 2025-05-19 ASSESSMENT — PAIN - FUNCTIONAL ASSESSMENT: PAIN_FUNCTIONAL_ASSESSMENT: 0-10

## 2025-05-19 ASSESSMENT — HEART SCORE: ECG: NORMAL

## 2025-05-19 ASSESSMENT — PAIN SCALES - GENERAL: PAINLEVEL_OUTOF10: 6

## 2025-05-19 NOTE — DISCHARGE INSTRUCTIONS
Please continue your antiplatelet regimen.    Please call your cardiology team to update them.    Please keep your event monitor on if at all possible.    Please have a low threshold to return to the emergency department should symptoms worsen.

## 2025-05-19 NOTE — ED PROVIDER NOTES
Triage Chief Complaint:   Chest Pain and Tachycardia    [place]:  Jennifer Blevins is a 65 y.o. female that presents with chest pain.  Patient was in baseline state of health until this morning when she awoke with forceful palpitations and \"an elephant on my chest\".  Patient reports this is the second episode like this that she is experienced.  Patient reports that symptoms have improved since she had a bowel movement here in the emergency department.  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  Patient has been undergoing workup for this with her cardiology team and is supposed to be wearing a Holter monitor/event monitor but for the last 5 days has had it off because she was experiencing some skin breakdown from the monitor.  Patient subsequently replaced her monitor this morning when the event started.  Some shortness of breath.  Chest pressure did radiate to the left neck and left arm.  Some associated fatigue for the past month.    Patient does have known coronary artery disease status post stent remotely.  Patient underwent cardiac cath approximately 1 year ago with patent stent in other coronaries.  No history of DVT or PE.  No prolonged immobilization.  No leg swelling.  Patient does report some underlying thyroid disorder and she is on thyroid medication.    ROS:  General:  No fevers, no chills, no weakness, + fatigue  Eyes:  No recent vison changes, no discharge  ENT:  No sore throat, no nasal congestion  Cardiovascular:  + chest pain, + palpitations  Respiratory:  No shortness of breath, no cough, no wheezing  Gastrointestinal:  No pain, no nausea, no vomiting, no diarrhea  Musculoskeletal:  No muscle pain, no joint pain  Skin:  No rash, no pruritis, no easy bruising  Neurologic:  No speech problems, no headache, no extremity numbness, no extremity tingling, no extremity weakness  Psychiatric:  No